# Patient Record
Sex: FEMALE | Race: OTHER | HISPANIC OR LATINO | URBAN - METROPOLITAN AREA
[De-identification: names, ages, dates, MRNs, and addresses within clinical notes are randomized per-mention and may not be internally consistent; named-entity substitution may affect disease eponyms.]

---

## 2017-08-28 ENCOUNTER — OUTPATIENT (OUTPATIENT)
Dept: OUTPATIENT SERVICES | Facility: HOSPITAL | Age: 37
LOS: 1 days | End: 2017-08-28
Payer: COMMERCIAL

## 2017-08-28 VITALS
SYSTOLIC BLOOD PRESSURE: 130 MMHG | RESPIRATION RATE: 18 BRPM | TEMPERATURE: 209 F | WEIGHT: 207.9 LBS | DIASTOLIC BLOOD PRESSURE: 104 MMHG | HEIGHT: 61.5 IN | HEART RATE: 88 BPM

## 2017-08-28 DIAGNOSIS — K43.0 INCISIONAL HERNIA WITH OBSTRUCTION, WITHOUT GANGRENE: ICD-10-CM

## 2017-08-28 DIAGNOSIS — Z90.89 ACQUIRED ABSENCE OF OTHER ORGANS: Chronic | ICD-10-CM

## 2017-08-28 DIAGNOSIS — Z98.84 BARIATRIC SURGERY STATUS: Chronic | ICD-10-CM

## 2017-08-28 DIAGNOSIS — K43.1 INCISIONAL HERNIA WITH GANGRENE: ICD-10-CM

## 2017-08-28 LAB
BASOPHILS # BLD AUTO: 0.02 K/UL — SIGNIFICANT CHANGE UP (ref 0–0.2)
BASOPHILS NFR BLD AUTO: 0.3 % — SIGNIFICANT CHANGE UP (ref 0–2)
BUN SERPL-MCNC: 10 MG/DL — SIGNIFICANT CHANGE UP (ref 7–23)
CALCIUM SERPL-MCNC: 9.1 MG/DL — SIGNIFICANT CHANGE UP (ref 8.4–10.5)
CHLORIDE SERPL-SCNC: 102 MMOL/L — SIGNIFICANT CHANGE UP (ref 98–107)
CO2 SERPL-SCNC: 24 MMOL/L — SIGNIFICANT CHANGE UP (ref 22–31)
CREAT SERPL-MCNC: 0.68 MG/DL — SIGNIFICANT CHANGE UP (ref 0.5–1.3)
EOSINOPHIL # BLD AUTO: 0.03 K/UL — SIGNIFICANT CHANGE UP (ref 0–0.5)
EOSINOPHIL NFR BLD AUTO: 0.5 % — SIGNIFICANT CHANGE UP (ref 0–6)
GLUCOSE SERPL-MCNC: 76 MG/DL — SIGNIFICANT CHANGE UP (ref 70–99)
HCT VFR BLD CALC: 37.3 % — SIGNIFICANT CHANGE UP (ref 34.5–45)
HGB BLD-MCNC: 11.8 G/DL — SIGNIFICANT CHANGE UP (ref 11.5–15.5)
IMM GRANULOCYTES # BLD AUTO: 0.02 # — SIGNIFICANT CHANGE UP
IMM GRANULOCYTES NFR BLD AUTO: 0.3 % — SIGNIFICANT CHANGE UP (ref 0–1.5)
LYMPHOCYTES # BLD AUTO: 1.8 K/UL — SIGNIFICANT CHANGE UP (ref 1–3.3)
LYMPHOCYTES # BLD AUTO: 29.4 % — SIGNIFICANT CHANGE UP (ref 13–44)
MCHC RBC-ENTMCNC: 24.3 PG — LOW (ref 27–34)
MCHC RBC-ENTMCNC: 31.6 % — LOW (ref 32–36)
MCV RBC AUTO: 76.9 FL — LOW (ref 80–100)
MONOCYTES # BLD AUTO: 0.32 K/UL — SIGNIFICANT CHANGE UP (ref 0–0.9)
MONOCYTES NFR BLD AUTO: 5.2 % — SIGNIFICANT CHANGE UP (ref 2–14)
NEUTROPHILS # BLD AUTO: 3.94 K/UL — SIGNIFICANT CHANGE UP (ref 1.8–7.4)
NEUTROPHILS NFR BLD AUTO: 64.3 % — SIGNIFICANT CHANGE UP (ref 43–77)
NRBC # FLD: 0 — SIGNIFICANT CHANGE UP
PLATELET # BLD AUTO: 249 K/UL — SIGNIFICANT CHANGE UP (ref 150–400)
PMV BLD: 9.8 FL — SIGNIFICANT CHANGE UP (ref 7–13)
POTASSIUM SERPL-MCNC: 3.7 MMOL/L — SIGNIFICANT CHANGE UP (ref 3.5–5.3)
POTASSIUM SERPL-SCNC: 3.7 MMOL/L — SIGNIFICANT CHANGE UP (ref 3.5–5.3)
RBC # BLD: 4.85 M/UL — SIGNIFICANT CHANGE UP (ref 3.8–5.2)
RBC # FLD: 14.8 % — HIGH (ref 10.3–14.5)
SODIUM SERPL-SCNC: 140 MMOL/L — SIGNIFICANT CHANGE UP (ref 135–145)
WBC # BLD: 6.13 K/UL — SIGNIFICANT CHANGE UP (ref 3.8–10.5)
WBC # FLD AUTO: 6.13 K/UL — SIGNIFICANT CHANGE UP (ref 3.8–10.5)

## 2017-08-28 PROCEDURE — 93010 ELECTROCARDIOGRAM REPORT: CPT

## 2017-08-28 NOTE — H&P PST ADULT - HISTORY OF PRESENT ILLNESS
36 yr old female with no significant medical hx presents preop evaluation with 36 yr old female with no significant medical hx presents preop evaluation with dx of Incisional Hernia with obstruction without gangrene.  Pt is now scheduled for Incisional Hernia Repair on 09/13/17.

## 2017-08-28 NOTE — H&P PST ADULT - LYMPHATIC
posterior cervical L/posterior cervical R/anterior cervical L/anterior cervical R/supraclavicular L/supraclavicular R

## 2017-08-28 NOTE — H&P PST ADULT - PSH
Delivered by  section    H/O laparoscopic adjustable gastric banding  vertical sleeve - 2015  History of tonsillectomy

## 2017-08-28 NOTE — H&P PST ADULT - FAMILY HISTORY
Mother  Still living? Yes, Estimated age: 51-60  Family history of cardiac disorder in mother, Age at diagnosis: Age Unknown

## 2017-08-28 NOTE — H&P PST ADULT - RS GEN PE MLT RESP DETAILS PC
breath sounds equal/respirations non-labored/clear to auscultation bilaterally/no rales/no rhonchi/no wheezes

## 2017-08-28 NOTE — H&P PST ADULT - GASTROINTESTINAL DETAILS
soft/no distention/bowel sounds normal/no bruit/no rebound tenderness/no guarding/no rigidity/no organomegaly

## 2017-08-28 NOTE — H&P PST ADULT - PROBLEM SELECTOR PLAN 1
Scheduled for Incisional Hernia Repair on 09/13/17.  Lab results pending.  Preop, famotidine, chlorhexidine and urine collection instructions provided and questions addressed.  Pt is scheduled to see Dr. Cisco Solo on 08/28/17 for evaluation with BP ranging 140-130/107-104 at [facility].

## 2017-09-12 ENCOUNTER — TRANSCRIPTION ENCOUNTER (OUTPATIENT)
Age: 37
End: 2017-09-12

## 2017-09-13 ENCOUNTER — RESULT REVIEW (OUTPATIENT)
Age: 37
End: 2017-09-13

## 2017-09-13 ENCOUNTER — OUTPATIENT (OUTPATIENT)
Dept: OUTPATIENT SERVICES | Facility: HOSPITAL | Age: 37
LOS: 1 days | Discharge: HOME | End: 2017-09-13
Payer: COMMERCIAL

## 2017-09-13 VITALS
DIASTOLIC BLOOD PRESSURE: 64 MMHG | SYSTOLIC BLOOD PRESSURE: 104 MMHG | HEART RATE: 76 BPM | OXYGEN SATURATION: 96 % | RESPIRATION RATE: 20 BRPM

## 2017-09-13 VITALS
SYSTOLIC BLOOD PRESSURE: 139 MMHG | RESPIRATION RATE: 18 BRPM | HEIGHT: 61.5 IN | OXYGEN SATURATION: 99 % | HEART RATE: 88 BPM | DIASTOLIC BLOOD PRESSURE: 104 MMHG | WEIGHT: 207.9 LBS | TEMPERATURE: 209 F

## 2017-09-13 DIAGNOSIS — Z90.89 ACQUIRED ABSENCE OF OTHER ORGANS: Chronic | ICD-10-CM

## 2017-09-13 DIAGNOSIS — K43.0 INCISIONAL HERNIA WITH OBSTRUCTION, WITHOUT GANGRENE: ICD-10-CM

## 2017-09-13 DIAGNOSIS — Z98.84 BARIATRIC SURGERY STATUS: Chronic | ICD-10-CM

## 2017-09-13 PROCEDURE — 88305 TISSUE EXAM BY PATHOLOGIST: CPT | Mod: 26

## 2017-09-13 RX ORDER — OXYCODONE AND ACETAMINOPHEN 5; 325 MG/1; MG/1
1 TABLET ORAL
Qty: 30 | Refills: 0
Start: 2017-09-13

## 2017-09-13 NOTE — ASU DISCHARGE PLAN (ADULT/PEDIATRIC). - NOTIFY
Bleeding that does not stop/Numbness, color, or temperature change to extremity/Persistent Nausea and Vomiting/Unable to Urinate/Pain not relieved by Medications/Fever greater than 101/Numbness, tingling/Inability to Tolerate Liquids or Foods/Increased Irritability or Sluggishness

## 2017-09-13 NOTE — BRIEF OPERATIVE NOTE - PROCEDURE
Incisional hernia repair  09/13/2017    Active  KAMILAHNEY <<-----Click on this checkbox to enter Procedure

## 2017-09-13 NOTE — ASU DISCHARGE PLAN (ADULT/PEDIATRIC). - ASU FOLLOWUP
911 or go to the nearest Emergency Room [facility] ([facility]):

## 2017-09-13 NOTE — ASU DISCHARGE PLAN (ADULT/PEDIATRIC). - MEDICATION SUMMARY - MEDICATIONS TO TAKE
I will START or STAY ON the medications listed below when I get home from the hospital:    oxyCODONE-acetaminophen 5 mg-325 mg oral tablet  -- 1 - 2 tab(s) by mouth every 4 hours, As Needed -for moderate pain - for severe pain MDD:8 tabs   -- Caution federal law prohibits the transfer of this drug to any person other  than the person for whom it was prescribed.  May cause drowsiness.  Alcohol may intensify this effect.  Use care when operating dangerous machinery.  This prescription cannot be refilled.  This product contains acetaminophen.  Do not use  with any other product containing acetaminophen to prevent possible liver damage.  Using more of this medication than prescribed may cause serious breathing problems.    -- Indication: For post op pain med I will START or STAY ON the medications listed below when I get home from the hospital:    oxy CODON E-acetaminophen 5 mg-325 mg oral tablet  -- 1 - 2 tab(s) by mouth every 4 hours, As Needed -for moderate pain - for severe pain MDD:8 tabs   -- Caution federal law prohibits the transfer of this drug to any person other  than the person for whom it was prescribed.  May cause drowsiness.  Alcohol may intensify this effect.  Use care when operating dangerous machinery.  This prescription cannot be refilled.  This product contains acetaminophen.  Do not use  with any other product containing acetaminophen to prevent possible liver damage.  Using more of this medication than prescribed may cause serious breathing problems.    -- Indication: For post op pain med

## 2017-10-08 ENCOUNTER — EMERGENCY (EMERGENCY)
Facility: HOSPITAL | Age: 37
LOS: 1 days | Discharge: HOME | End: 2017-10-08
Attending: EMERGENCY MEDICINE | Admitting: EMERGENCY MEDICINE
Payer: COMMERCIAL

## 2017-10-08 VITALS
DIASTOLIC BLOOD PRESSURE: 97 MMHG | RESPIRATION RATE: 16 BRPM | HEART RATE: 79 BPM | TEMPERATURE: 98 F | OXYGEN SATURATION: 100 % | SYSTOLIC BLOOD PRESSURE: 138 MMHG

## 2017-10-08 DIAGNOSIS — Z98.84 BARIATRIC SURGERY STATUS: Chronic | ICD-10-CM

## 2017-10-08 DIAGNOSIS — Z90.89 ACQUIRED ABSENCE OF OTHER ORGANS: Chronic | ICD-10-CM

## 2017-10-08 PROCEDURE — 99284 EMERGENCY DEPT VISIT MOD MDM: CPT | Mod: 25

## 2017-10-08 NOTE — ED ADULT TRIAGE NOTE - CHIEF COMPLAINT QUOTE
Pt s/p hernia repair on 9/13 c/o serosanguineous drainage from surgical site with redness surrounding site since this morning. Denies fever, chills, n/v/d, pain at incision site

## 2017-10-09 VITALS
OXYGEN SATURATION: 100 % | HEART RATE: 70 BPM | RESPIRATION RATE: 18 BRPM | DIASTOLIC BLOOD PRESSURE: 95 MMHG | TEMPERATURE: 99 F | SYSTOLIC BLOOD PRESSURE: 152 MMHG

## 2017-10-09 LAB
BASOPHILS # BLD AUTO: 0.04 K/UL — SIGNIFICANT CHANGE UP (ref 0–0.2)
BASOPHILS NFR BLD AUTO: 0.5 % — SIGNIFICANT CHANGE UP (ref 0–2)
EOSINOPHIL # BLD AUTO: 0.13 K/UL — SIGNIFICANT CHANGE UP (ref 0–0.5)
EOSINOPHIL NFR BLD AUTO: 1.6 % — SIGNIFICANT CHANGE UP (ref 0–6)
HCT VFR BLD CALC: 33 % — LOW (ref 34.5–45)
HGB BLD-MCNC: 10.5 G/DL — LOW (ref 11.5–15.5)
IMM GRANULOCYTES # BLD AUTO: 0.03 # — SIGNIFICANT CHANGE UP
IMM GRANULOCYTES NFR BLD AUTO: 0.4 % — SIGNIFICANT CHANGE UP (ref 0–1.5)
LYMPHOCYTES # BLD AUTO: 2.52 K/UL — SIGNIFICANT CHANGE UP (ref 1–3.3)
LYMPHOCYTES # BLD AUTO: 30.8 % — SIGNIFICANT CHANGE UP (ref 13–44)
MCHC RBC-ENTMCNC: 24.5 PG — LOW (ref 27–34)
MCHC RBC-ENTMCNC: 31.8 % — LOW (ref 32–36)
MCV RBC AUTO: 76.9 FL — LOW (ref 80–100)
MONOCYTES # BLD AUTO: 0.57 K/UL — SIGNIFICANT CHANGE UP (ref 0–0.9)
MONOCYTES NFR BLD AUTO: 7 % — SIGNIFICANT CHANGE UP (ref 2–14)
NEUTROPHILS # BLD AUTO: 4.9 K/UL — SIGNIFICANT CHANGE UP (ref 1.8–7.4)
NEUTROPHILS NFR BLD AUTO: 59.7 % — SIGNIFICANT CHANGE UP (ref 43–77)
NRBC # FLD: 0 — SIGNIFICANT CHANGE UP
PLATELET # BLD AUTO: 230 K/UL — SIGNIFICANT CHANGE UP (ref 150–400)
PMV BLD: 9.9 FL — SIGNIFICANT CHANGE UP (ref 7–13)
RBC # BLD: 4.29 M/UL — SIGNIFICANT CHANGE UP (ref 3.8–5.2)
RBC # FLD: 15.2 % — HIGH (ref 10.3–14.5)
WBC # BLD: 8.19 K/UL — SIGNIFICANT CHANGE UP (ref 3.8–10.5)
WBC # FLD AUTO: 8.19 K/UL — SIGNIFICANT CHANGE UP (ref 3.8–10.5)

## 2017-10-09 RX ORDER — CEPHALEXIN 500 MG
1 CAPSULE ORAL
Qty: 28 | Refills: 0
Start: 2017-10-09 | End: 2017-10-16

## 2017-10-09 NOTE — CONSULT NOTE ADULT - SUBJECTIVE AND OBJECTIVE BOX
General Surgery Consult Note (A Team - Dr. Holm/Kimmie)  Pager [phone]    HPI: 36 year old woman s/p primary incisional hernia repair 17 presents with drainage from her midline wound.  She has not have fevers/chills.  She describes the drainage as "purulent" and not malodorous.  She has been tolerating oral intake, having no abdominal pain and having bowel function.  No other symptoms.      PAST MEDICAL & SURGICAL HISTORY:  Incisional hernia with gangrene and obstruction  History of tonsillectomy  H/O laparoscopic adjustable gastric banding: vertical sleeve -   Delivered by  section:       ALLERGIES: Cipro    SOCIAL HISTORY: nonsmoker, no etoh or drug abuse    FAMILY HISTORY: maternal CAD  ___________________________________________  REVIEW OF SYSTEMS:    CONSTITUTIONAL: No weakness, fevers or chills  EYES/ENT: No visual changes;  No vertigo or throat pain   NECK: No pain or stiffness  RESPIRATORY: No cough, wheezing, hemoptysis; No shortness of breath  CARDIOVASCULAR: No chest pain or palpitations  GASTROINTESTINAL: No abdominal or epigastric pain. No nausea, vomiting, or hematemesis; No diarrhea or constipation. No melena or hematochezia.  GENITOURINARY: No dysuria, frequency or hematuria  NEUROLOGICAL: No numbness or weakness  SKIN: See HPI  All other review of systems is negative unless indicated above.    ___________________________________________  PHYSICAL EXAM:  Vital Signs Last 24 Hrs  T(C): 37 (09 Oct 2017 02:02), Max: 37 (09 Oct 2017 02:02)  T(F): 98.6 (09 Oct 2017 02:02), Max: 98.6 (09 Oct 2017 02:02)  HR: 70 (09 Oct 2017 02:02) (70 - 79)  BP: 152/95 (09 Oct 2017 02:02) (138/97 - 152/95)  RR: 18 (09 Oct 2017 02:02) (16 - 18)  SpO2: 100% (09 Oct 2017 02:02) (100% - 100%)CAPILLARY BLOOD GLUCOSE    General: No acute distress, appears nontoxic  Neurologic: No focal deficits  Psychiatric: Appropriate affect  Cardiovascular: Regular rate and rhythm  Pulmonary: Unlabored breathing  Abdominal: Nontender, soft, nondistended; midline wound with minimal serosanguineous drainage, small area of induration, no fluctuance, no erythema  Extremities: No edema, moves all without limitations  Skin: Warm, no rashes	  	____________________________________________  LABS:  CBC Full  -  ( 09 Oct 2017 04:03 )  WBC Count : 8.19 K/uL  Hemoglobin : 10.5 g/dL  Hematocrit : 33.0 %  Platelet Count - Automated : 230 K/uL  Mean Cell Volume : 76.9 fL  Mean Cell Hemoglobin : 24.5 pg  Mean Cell Hemoglobin Concentration : 31.8 %  Auto Neutrophil # : 4.90 K/uL  Auto Lymphocyte # : 2.52 K/uL  Auto Monocyte # : 0.57 K/uL  Auto Eosinophil # : 0.13 K/uL  Auto Basophil # : 0.04 K/uL  Auto Neutrophil % : 59.7 %  Auto Lymphocyte % : 30.8 %  Auto Monocyte % : 7.0 %  Auto Eosinophil % : 1.6 %  Auto Basophil % : 0.5 %      ____________________________________________  RADIOLOGY:  No imaging

## 2017-10-09 NOTE — ED ADULT NURSE NOTE - OBJECTIVE STATEMENT
rec'd pt [id number] in room 13 c/o serosanguinous drainage from the surgical site of her umbilical hernia repair on 09/13/17.  Pt. reports a rash to the skin surrounding the sutures since the day after she had surgery, was prescribed steroids by urgent care w/out relief and a cream by her PMD which pt. states she allergic to.  Pt. has followed up w/ her surgeon twice since the surgery and states she only noticed the drainage from the suture site this afternoon.  Pt. denies pain/discomfort to the suture site but reports itchiness around it, minor redness noted w/ a small amount of yellow-brownish discharge at the bottom of the suture site.  No active bleeding noted.  Vital signs stable. Pt. awaiting orders and MD evaluation.

## 2017-10-09 NOTE — ED PROVIDER NOTE - OBJECTIVE STATEMENT
35 y/o F s/p incisional hernia repair 9/13 p/w pus draining from her wound since last night. Pt reports seeing green/yellow pus coming from her incision. Pt denies fevers, chills, nausea, vomiting, diarrhea, constipation, back pain.  Surg: Jason Holm

## 2017-10-09 NOTE — ED PROVIDER NOTE - MEDICAL DECISION MAKING DETAILS
incision clean/dry with no pus expressed, no induration, surg c/s, no indication for imaging at this time, reassess, likely dc with surg f/u

## 2017-10-09 NOTE — ED PROVIDER NOTE - PROGRESS NOTE DETAILS
Surg c/s appreciated, will dc pt with keflex x 1 week, follow up in 2 days. Patient is stable, vital signs stable. Patient with capacity, able to verbalize understanding of discharge instructions, return instructions, and need for close follow up.  Demetris Block MD PGY-4

## 2017-10-09 NOTE — CONSULT NOTE ADULT - ASSESSMENT
36 year old woman s/p recent incisional hernia repair presents with superficial site infection.  - No need for urgent surgical intervention or hospitalization  - Keflex for 7 days  - Follow up with Dr. Holm in the office on Tuesday 10/10; call the office after 9 am to schedule an appointment  - Plan discussed with Dr. Kimmie Payne General Surgery Resident PGY3 Contact # [phone]

## 2017-10-09 NOTE — ED PROVIDER NOTE - ATTENDING CONTRIBUTION TO CARE
DOUG Thornton: I have personally performed a face to face bedside history and physical examination of this patient. I have discussed the history, examination, review of systems, assessment and plan of management with the resident. I have reviewed the electronic medical record and amended it to reflect my history, review of systems, physical exam, assessment and plan.    Patient p/w green "pus" coming from her surgical site s/p incisional hernia repair 1 m ago. Noticed it earlier tday, never had it before, no associated fevers, chills, ha, cp, sob, abd pain, vomiting, diarrhea, dysuria. No surrounding redness or pain. Never occurred before, surgeon dr. fuentes. Passing gas, normal bms, no blood.  exam  GEN - NAD; well appearing; A+O x3   HEAD - NC/AT   EYES- PERRL, EOMI  ENT: Airway patent, mmm, Oral cavity and pharynx normal. No inflammation, swelling, exudate, or lesions.  NECK: Neck supple, non-tender without lymphadenopathy, no masses.  PULMONARY - CTA b/l, symmetric breath sounds.   CARDIAC -s1s2, RRR, no M,G,R  ABDOMEN - +BS, ND, NT, soft, no guarding, no rebound  BACK - no CVA tenderness, Normal  spine   EXTREMITIES - symmetric pulses, capillary refill < 2 seconds, no clubbing, no cyanosis, no edema   SKIN - no rash or bruising, supraubilical incision site with trace serosanguinous drainage, minimal induration, no erythema or fluctuance,   NEUROLOGIC - alert, CN 2-12 intact, nl strength/sensation, no focal deficits  PSYCH -nl mood/affect, nl insight.  a/p-36f with drainage from incision  which started tonight, well appearing, minimal drainage, no pain, tolerating po, normal bms and passing gas, will d/w surgery, likely oral abx and f/u with them.

## 2020-08-10 NOTE — ASU PREOPERATIVE ASSESSMENT, ADULT ([facility] ONLY) - RESPIRATORY RATE (BREATHS/MIN)
Telemedicine Visit: Established Patient     This Remote Face to Face encounter was conducted via Zoom. Given the importance of social distancing and other strategies recommended to reduce the risk of COVID-19 transmission, I am providing medical care to this patient via audio/video visit in place of an in person visit at the request of the patient. Verbal consent to telehealth, risks, benefits, and consequences were discussed. Patient retains the right to withdraw at any time. All existing confidentiality protections apply. The patient has access to all transmitted medical information. No dissemination of any patient images or information to other entities without further written consent.    CHIEF COMPLAINT     Neck pain, oxycodone refill    HPI  Serena Rogers is a 55 y.o. female who presents today for the following     Neck pain, neuropathic pain, arthralgia, muscle spasm  LE swelling  - Stable with oxycodone w/o tylenol              - c/o muscle, neck, joints (hands, knees), fatigue, LE swelling  - oxycodone, 3/25 mg BID prn, refill: 5/26/2020, 8/10/2020  -Controlled substance agreement:     9/6/2019  -Urine drug screen:                             9/6/2019  -Opioid risk tool, 0:                              10/9/2019     - Onset: remote  - Triger: multiple procedures  -Unknown etiology for neuropathic pain  - located in: lower back  - intensity: mild to moderate  - quality: dull and sharp   - radiation: no  - alleviating factors are: rest, pain medication occasionally, noted daily  -She weaned her off of all medication, except the above  - exacerbating factors are: activity  - accompanied:   - no numbness, weakness, tingling, fever, chills  - course: Improved but persistent  - imaging: Multiple  - treatment: as above     No reported history of:  - chronic immune suppression, alcoholism, IV drug abuse, indwelling catheter, diabetes.   - No history of recent spinal surgery or injection.     Denies:  -  numbness/saddle anesthesia.  - bowel/bladder changes, fever.   - trauma  - nausea/vomiting  - chest pain, shortness of breath, abdominal pain.    Reviewed PMH, PSH, FH, SH, ALL, HCM/IMM, no changes  Reviewed MEDS, no changes    Patient Active Problem List    Diagnosis Date Noted   • Arthralgia 05/26/2020   • Fatty liver 10/09/2019   • Hypovitaminosis D 10/09/2019   • Hypercholesterolemia 10/09/2019   • Leukopenia 10/09/2019   • Controlled substance agreement signed 10/09/2019   • Muscle spasm 10/09/2019   • Healthcare maintenance 10/09/2019   • Neck pain 09/07/2019   • Peripheral neuropathic pain 09/07/2019   • Fatigue 09/07/2019   • Dyslipidemia 09/07/2019   • Health care maintenance 09/07/2019     CURRENT MEDICATIONS  Current Outpatient Medications   Medication Sig Dispense Refill   • [START ON 8/26/2020] oxyCODONE immediate-release (ROXICODONE) 5 MG Tab Take 1 Tab by mouth every four hours as needed for Severe Pain for up to 30 days. 60 Tab 0   • oxyCODONE-acetaminophen (PERCOCET) 5-325 MG Tab Take 1-2 Tabs by mouth every four hours as needed for up to 30 days. 60 Tab 0   • hydrochlorothiazide (MICROZIDE) 12.5 MG capsule Take 1 Cap by mouth every day. 90 Cap 0   • meclizine (ANTIVERT) 25 MG Tab Take 1 Tab by mouth 3 times a day as needed. 30 Tab 0   • fluconazole (DIFLUCAN) 10 MG/ML Recon Susp Take 6 mg/kg by mouth every day.     • metroNIDAZOLE (FLAGYL) 5-0.79 MG/ML-% Solution 500 mg by Intravenous route every 8 hours.     • carisoprodol (SOMA) 350 MG Tab Take 350 mg by mouth 4 times a day.       No current facility-administered medications for this visit.      ALLERGIES  Allergies: Bee venom, Codeine, Iodine, Penicillins, Vancomycin, Aspirin, Ibuprofen, Latex, and Percocet [apap-fd&c red #40 [place]-oxycodone]  PAST MEDICAL HISTORY  Past Medical History:   Diagnosis Date   • Dyslipidemia    • Fatty liver    • Hypoglycemia    • Hypovitaminosis D    • Knee osteoarthritis    • Peripheral neuropathy      SURGICAL  "HISTORY  She  has a past surgical history that includes primary c section and ankle arthroscopy.  SOCIAL HISTORY  Social History     Tobacco Use   • Smoking status: Never Smoker   • Smokeless tobacco: Never Used   Substance Use Topics   • Alcohol use: Not on file   • Drug use: Not on file     Social History     Social History Narrative   • Not on file     FAMILY HISTORY  Family History   Problem Relation Age of Onset   • Diabetes Mother    • Hypertension Mother    • Hyperlipidemia Mother    • Heart Disease Father    • Hypertension Father    • Hyperlipidemia Father    • No Known Problems Sister    • No Known Problems Brother      Family Status   Relation Name Status   • Mo  Alive   • Fa  Alive   • Sis  Alive   • Bro  Alive       ROS   Constitutional: Negative for fever, chills, fatigue.  HENT: Negative for congestion, sore throat.  Eyes: Negative for vision problems.   Respiratory: Negative for cough, shortness of breath.  Cardiovascular: Negative for chest pain, palpitations.   Gastrointestinal: Negative for heartburn, nausea, abdominal pain.   Genitourinary: Negative for dysuria.  Musculoskeletal: As above..   Skin: Negative for rash.   Neuro: Negative for dizziness, weakness and headaches.   Endo/Heme/Allergies: Does not bruise/bleed easily.   Psychiatric/Behavioral: Negative for depression.    Objective   Vitals obtained by patient:  Temperature 36.4 °C (97.5 °F)   Height 1.702 m (5' 7\")   Weight 82 kg (180 lb 12.4 oz)   Body Mass Index 28.31 kg/m²   Physical Exam:  Constitutional: Alert, no distress, well-groomed.  Skin: No rash in visible areas.  Eye: Round. Conjunctiva clear, lids normal.  ENMT: Lips pink without lesions, good dentition. Phonation normal.  Neck: No visible masses or thyromegaly. Moves freely without pain.  CV: no peripheral cyanosis, tachycardia.  Respiratory: Unlabored respiratory effort, no cough or audible wheezing.  Psych: Alert and oriented x3, normal affect and mood.     Labs     Labs " are reviewed and discussed with a patient  Lab Results   Component Value Date/Time    CHOLSTRLTOT 253 (H) 05/26/2020 10:36 AM     (H) 05/26/2020 10:36 AM    HDL 65 05/26/2020 10:36 AM    TRIGLYCERIDE 97 05/26/2020 10:36 AM       Lab Results   Component Value Date/Time    SODIUM 138 05/26/2020 10:36 AM    POTASSIUM 4.3 05/26/2020 10:36 AM    CHLORIDE 104 05/26/2020 10:36 AM    CO2 24 05/26/2020 10:36 AM    GLUCOSE 87 05/26/2020 10:36 AM    BUN 18 05/26/2020 10:36 AM    CREATININE 0.69 05/26/2020 10:36 AM     Lab Results   Component Value Date/Time    ALKPHOSPHAT 64 05/26/2020 10:36 AM    ASTSGOT 17 05/26/2020 10:36 AM    ALTSGPT 15 05/26/2020 10:36 AM    TBILIRUBIN 0.8 05/26/2020 10:36 AM      No results found for: HBA1C  No results found for: TSH  No results found for: FREET4    Lab Results   Component Value Date/Time    WBC 3.9 (L) 05/26/2020 10:36 AM    RBC 4.90 05/26/2020 10:36 AM    HEMOGLOBIN 14.9 05/26/2020 10:36 AM    HEMATOCRIT 45.4 05/26/2020 10:36 AM    MCV 92.7 05/26/2020 10:36 AM    MCH 30.4 05/26/2020 10:36 AM    MCHC 32.8 (L) 05/26/2020 10:36 AM    MPV 9.9 05/26/2020 10:36 AM    NEUTSPOLYS 43.50 (L) 05/26/2020 10:36 AM    LYMPHOCYTES 44.30 (H) 05/26/2020 10:36 AM    MONOCYTES 8.50 05/26/2020 10:36 AM    EOSINOPHILS 2.60 05/26/2020 10:36 AM    BASOPHILS 0.80 05/26/2020 10:36 AM      Imaging      None    Assessment and Plan     Serena Rogers is a 55 y.o. female    1. Neck pain  Controlled, continue with current treatment.    - oxyCODONE immediate-release (ROXICODONE) 5 MG Tab; Take 1 Tab by mouth every four hours as needed for Severe Pain for up to 30 days.  Dispense: 60 Tab; Refill: 0  - oxyCODONE immediate-release (ROXICODONE) 5 MG Tab; Take 1 Tab by mouth every four hours as needed for Severe Pain for up to 30 days.  Dispense: 60 Tab; Refill: 0  - oxyCODONE immediate-release (ROXICODONE) 5 MG Tab; Take 1 Tab by mouth every four hours as needed for Severe Pain for up to 30 days.  Dispense:  60 Tab; Refill: 0    2. Peripheral neuropathic pain  As above  - oxyCODONE immediate-release (ROXICODONE) 5 MG Tab; Take 1 Tab by mouth every four hours as needed for Severe Pain for up to 30 days.  Dispense: 60 Tab; Refill: 0  - oxyCODONE immediate-release (ROXICODONE) 5 MG Tab; Take 1 Tab by mouth every four hours as needed for Severe Pain for up to 30 days.  Dispense: 60 Tab; Refill: 0  - oxyCODONE immediate-release (ROXICODONE) 5 MG Tab; Take 1 Tab by mouth every four hours as needed for Severe Pain for up to 30 days.  Dispense: 60 Tab; Refill: 0  3. Arthralgia, unspecified joint  - oxyCODONE immediate-release (ROXICODONE) 5 MG Tab; Take 1 Tab by mouth every four hours as needed for Severe Pain for up to 30 days.  Dispense: 60 Tab; Refill: 0  - oxyCODONE immediate-release (ROXICODONE) 5 MG Tab; Take 1 Tab by mouth every four hours as needed for Severe Pain for up to 30 days.  Dispense: 60 Tab; Refill: 0  - oxyCODONE immediate-release (ROXICODONE) 5 MG Tab; Take 1 Tab by mouth every four hours as needed for Severe Pain for up to 30 days.  Dispense: 60 Tab; Refill: 0  4. Muscle spasm  - carisoprodol (SOMA) 350 MG Tab; Take 1 Tab by mouth 2 Times a Day for 30 days.  Dispense: 60 Tab; Refill: 2    5. Controlled substance agreement signed  - oxyCODONE immediate-release (ROXICODONE) 5 MG Tab; Take 1 Tab by mouth every four hours as needed for Severe Pain for up to 30 days.  Dispense: 60 Tab; Refill: 0  - oxyCODONE immediate-release (ROXICODONE) 5 MG Tab; Take 1 Tab by mouth every four hours as needed for Severe Pain for up to 30 days.  Dispense: 60 Tab; Refill: 0  - carisoprodol (SOMA) 350 MG Tab; Take 1 Tab by mouth 2 Times a Day for 30 days.  Dispense: 60 Tab; Refill: 2  - oxyCODONE immediate-release (ROXICODONE) 5 MG Tab; Take 1 Tab by mouth every four hours as needed for Severe Pain for up to 30 days.  Dispense: 60 Tab; Refill: 0  6. Chronic use of opiate drug for therapeutic purpose  - oxyCODONE immediate-release  (ROXICODONE) 5 MG Tab; Take 1 Tab by mouth every four hours as needed for Severe Pain for up to 30 days.  Dispense: 60 Tab; Refill: 0  - oxyCODONE immediate-release (ROXICODONE) 5 MG Tab; Take 1 Tab by mouth every four hours as needed for Severe Pain for up to 30 days.  Dispense: 60 Tab; Refill: 0  - carisoprodol (SOMA) 350 MG Tab; Take 1 Tab by mouth 2 Times a Day for 30 days.  Dispense: 60 Tab; Refill: 2  - oxyCODONE immediate-release (ROXICODONE) 5 MG Tab; Take 1 Tab by mouth every four hours as needed for Severe Pain for up to 30 days.  Dispense: 60 Tab; Refill: 0  7. Uncomplicated opioid dependence (HCC)  - oxyCODONE immediate-release (ROXICODONE) 5 MG Tab; Take 1 Tab by mouth every four hours as needed for Severe Pain for up to 30 days.  Dispense: 60 Tab; Refill: 0  - oxyCODONE immediate-release (ROXICODONE) 5 MG Tab; Take 1 Tab by mouth every four hours as needed for Severe Pain for up to 30 days.  Dispense: 60 Tab; Refill: 0  - carisoprodol (SOMA) 350 MG Tab; Take 1 Tab by mouth 2 Times a Day for 30 days.  Dispense: 60 Tab; Refill: 2  - oxyCODONE immediate-release (ROXICODONE) 5 MG Tab; Take 1 Tab by mouth every four hours as needed for Severe Pain for up to 30 days.  Dispense: 60 Tab; Refill: 0    Obtained and reviewed patient utilization report from [facility] pharmacy database on 8/10/2020 5:18 PM  prior to writing prescription for controlled substance II, III or IV per [place] bill . Based on assessment of the report, the prescription is medically necessary.     8. Encounter for screening mammogram for malignant neoplasm of breast  - MA-SCREENING MAMMO BILAT W/TOMOSYNTHESIS W/CAD; Future    Follow-up: in 3 months and prn             18 16

## 2020-08-19 ENCOUNTER — APPOINTMENT (OUTPATIENT)
Dept: SURGERY | Facility: CLINIC | Age: 40
End: 2020-08-19
Payer: COMMERCIAL

## 2020-08-19 VITALS
SYSTOLIC BLOOD PRESSURE: 137 MMHG | BODY MASS INDEX: 38.33 KG/M2 | HEIGHT: 61 IN | RESPIRATION RATE: 72 BRPM | WEIGHT: 203 LBS | DIASTOLIC BLOOD PRESSURE: 91 MMHG | TEMPERATURE: 97.8 F | OXYGEN SATURATION: 100 %

## 2020-08-19 DIAGNOSIS — R19.00 INTRA-ABDOMINAL AND PELVIC SWELLING, MASS AND LUMP, UNSPECIFIED SITE: ICD-10-CM

## 2020-08-19 DIAGNOSIS — Z78.9 OTHER SPECIFIED HEALTH STATUS: ICD-10-CM

## 2020-08-19 DIAGNOSIS — Z87.19 PERSONAL HISTORY OF OTHER DISEASES OF THE DIGESTIVE SYSTEM: ICD-10-CM

## 2020-08-19 DIAGNOSIS — Z82.49 FAMILY HISTORY OF ISCHEMIC HEART DISEASE AND OTHER DISEASES OF THE CIRCULATORY SYSTEM: ICD-10-CM

## 2020-08-19 PROBLEM — Z00.00 ENCOUNTER FOR PREVENTIVE HEALTH EXAMINATION: Status: ACTIVE | Noted: 2020-08-19

## 2020-08-19 PROCEDURE — 99203 OFFICE O/P NEW LOW 30 MIN: CPT

## 2020-08-19 NOTE — PHYSICAL EXAM
[Respiratory Effort] : normal respiratory effort [Oriented to Person] : oriented to person [Alert] : alert [Oriented to Place] : oriented to place [Oriented to Time] : oriented to time [Calm] : calm [JVD] : no jugular venous distention  [Abdominal Masses] : No abdominal masses [de-identified] : TAMMY ARECHIGA NAD [Abdomen Tenderness] : ~T ~M No abdominal tenderness [de-identified] : EOMI [de-identified] : soft NT ND + well healed scar with slight eventration above the scar with no obvious defect., non tender.

## 2020-08-19 NOTE — ASSESSMENT
[FreeTextEntry1] : 38 yo female with small eventration without obvious hernia. No surgery needed at this time.

## 2020-08-19 NOTE — HISTORY OF PRESENT ILLNESS
[de-identified] : 40 yo healthy female with a h/o umbilical hernia repair in 2017 presents for evaluation of a possible recurrent hernia. SHe states she feels a small bulge above her surgical scar, but denies any pain. She denies any nausea or vomiting.

## 2023-09-26 NOTE — H&P PST ADULT - DOES PATIENT HAVE ADVANCE DIRECTIVE
Constant Observer Yes - Name: Hortensia   Constant Observer Oriented YES   High risk patients are in line of sight at all times Yes   Excess equipment/medical supplies not necessary for the care of the patient removed Yes   All sharp or dangerous objects are removed from room: including but not limited to belts, pens & pencils, needles, medications, cosmetics, lighters, matches, nail files, watches, necklaces, glass objects, razors, razor blades, knives, aerosol sprays, drawstring pants, shoes, cords (telephone, call bells, etc.) cleaning wipes or other cleaning items, aluminum cans, not permanently attached wall décor Yes   Telephone/cell phone removed as well as TV remote (batteries can be swallowed) Yes   Patient belongings removed and labeled at nurses station Yes   Excess linen is removed from room Yes   All plastic bags are removed from the room and replaced with paper trash bags Yes   Patient is in paper scrubs or appropriate gown and using hospital socks with rubber soles Yes   No metal, hard eating utensils or hard plates are on meal tray Yes   Remove all cleaning agents used by Obdulia's Yes   If Crucifix is hanging on a nail, remove Crucifix as well as the nail Yes       *If any question above is answered \"No,\" documentation is required.
I personally spent
info given and explained/No

## 2024-06-14 NOTE — ED ADULT NURSE NOTE - NS PRO PASSIVE SMOKE EXP
Anesthesia Post Evaluation    Patient: Chelsea Rodriguez    Procedure(s) Performed: Procedure(s) (LRB):  COLONOSCOPY (N/A)    Final Anesthesia Type: general      Patient location during evaluation: PACU  Patient participation: Yes- Able to Participate  Level of consciousness: awake and alert  Post-procedure vital signs: reviewed and stable  Pain management: adequate  Airway patency: patent    PONV status at discharge: No PONV  Anesthetic complications: no      Cardiovascular status: stable  Respiratory status: spontaneous ventilation  Hydration status: euvolemic  Follow-up not needed.            Vitals Value Taken Time   /62 06/14/24 1531   Temp 36 06/14/24 1540   Pulse 73 06/14/24 1539   Resp 29 06/14/24 1539   SpO2 100 % 06/14/24 1539   Vitals shown include unfiled device data.      No case tracking events are documented in the log.      Pain/Chinyere Score: Chinyere Score: 9 (6/14/2024  3:25 PM)          
Unknown

## 2024-09-16 NOTE — ASU PREOP CHECKLIST - BP NONINVASIVE DIASTOLIC (MM HG)
Consent: The patient's consent was obtained including but not limited to risks of crusting, scabbing, blistering, scarring, darker or lighter pigmentary change, recurrence, incomplete removal and infection. Render Note In Bullet Format When Appropriate: No Duration Of Freeze Thaw-Cycle (Seconds): 5 Show Aperture Variable?: Yes Post-Care Instructions: I reviewed with the patient in detail post-care instructions. Patient is to wear sunprotection, and avoid picking at any of the treated lesions. Pt may apply Vaseline to crusted or scabbing areas. Detail Level: Zone Number Of Freeze-Thaw Cycles: 2 freeze-thaw cycles 75

## 2024-09-16 NOTE — H&P PST ADULT - PRO ANTICIPATED DISCH DISP
Please call the [facility] at  [phone] if any of your medications change.  This means: if a med is discontinued, a new med is started, or a dose is changed.  These meds may interact with your warfarin and we may need to adjust your dose.  This is especially important if you start any type of ANTIBIOTIC.    Also, please call if you have any admissions to the hospital, visits to an emergency room, procedures planned, or if any other medical provider has instructed you to hold your warfarin.     
home

## 2025-03-10 NOTE — H&P PST ADULT - MS EXT PE MLT D E PC
Health Maintenance       HPV Vaccine (2 - Male 2-dose series)  Overdue since 2/2/2023    Annual Physical (ages 3 - 21) (Yearly)  Overdue since 8/2/2023    Influenza Vaccine (1)  Overdue since 9/1/2024    COVID-19 Vaccine (1 - 2024-25 season)  Never done    Depression Screening (Yearly)  Due soon on 4/12/2025           Following review of the above:  Patient is not proceeding with: COVID-19, HPV, and Influenza    Note: Refer to final orders and clinician documentation.       no clubbing/no cyanosis/no pedal edema